# Patient Record
Sex: MALE | Race: WHITE | NOT HISPANIC OR LATINO | Employment: OTHER | URBAN - METROPOLITAN AREA
[De-identification: names, ages, dates, MRNs, and addresses within clinical notes are randomized per-mention and may not be internally consistent; named-entity substitution may affect disease eponyms.]

---

## 2017-06-06 ENCOUNTER — HISTORICAL (OUTPATIENT)
Dept: ADMINISTRATIVE | Facility: HOSPITAL | Age: 47
End: 2017-06-06

## 2024-02-08 PROBLEM — F25.1 SCHIZOAFFECTIVE DISORDER, DEPRESSIVE TYPE: Status: ACTIVE | Noted: 2024-02-08

## 2024-07-01 PROBLEM — J41.8 MIXED SIMPLE AND MUCOPURULENT CHRONIC BRONCHITIS: Status: ACTIVE | Noted: 2024-07-01

## 2024-09-08 ENCOUNTER — NURSE TRIAGE (OUTPATIENT)
Dept: ADMINISTRATIVE | Facility: CLINIC | Age: 54
End: 2024-09-08
Payer: COMMERCIAL

## 2024-09-09 NOTE — TELEPHONE ENCOUNTER
Pt calling states he takes psych meds and medical meds. States he took a drug test and tested positive for Kratum and states he started Depakote and wants to know if Depakote could have caused him to be positive for Kratum. Pt denies any symptoms. Pt states pcp is not with [facility]. Sandra Gonzales is his pcp advised pt to contact pcp when office is open or to contact pharmacist when open.   Reason for Disposition   Health information question, no triage required and triager able to answer question    Protocols used: Information Only Call - No Triage-A-